# Patient Record
Sex: FEMALE | Race: WHITE | NOT HISPANIC OR LATINO | URBAN - METROPOLITAN AREA
[De-identification: names, ages, dates, MRNs, and addresses within clinical notes are randomized per-mention and may not be internally consistent; named-entity substitution may affect disease eponyms.]

---

## 2025-01-06 ENCOUNTER — APPOINTMENT (OUTPATIENT)
Dept: OBSTETRICS AND GYNECOLOGY | Facility: CLINIC | Age: 45
End: 2025-01-06
Payer: COMMERCIAL

## 2025-01-06 ENCOUNTER — PATIENT MESSAGE (OUTPATIENT)
Dept: OBSTETRICS AND GYNECOLOGY | Facility: CLINIC | Age: 45
End: 2025-01-06

## 2025-01-06 ENCOUNTER — LAB (OUTPATIENT)
Dept: LAB | Facility: LAB | Age: 45
End: 2025-01-06
Payer: COMMERCIAL

## 2025-01-06 VITALS
BODY MASS INDEX: 36.96 KG/M2 | DIASTOLIC BLOOD PRESSURE: 78 MMHG | WEIGHT: 230 LBS | SYSTOLIC BLOOD PRESSURE: 146 MMHG | HEIGHT: 66 IN

## 2025-01-06 DIAGNOSIS — N93.8 DUB (DYSFUNCTIONAL UTERINE BLEEDING): ICD-10-CM

## 2025-01-06 DIAGNOSIS — N93.8 DUB (DYSFUNCTIONAL UTERINE BLEEDING): Primary | ICD-10-CM

## 2025-01-06 LAB
B-HCG SERPL-ACNC: 171 MIU/ML
PROLACTIN SERPL-MCNC: 8.4 UG/L (ref 3–20)
TSH SERPL-ACNC: 1.46 MIU/L (ref 0.44–3.98)

## 2025-01-06 PROCEDURE — 1036F TOBACCO NON-USER: CPT | Performed by: NURSE PRACTITIONER

## 2025-01-06 PROCEDURE — 84146 ASSAY OF PROLACTIN: CPT

## 2025-01-06 PROCEDURE — 99203 OFFICE O/P NEW LOW 30 MIN: CPT | Performed by: NURSE PRACTITIONER

## 2025-01-06 PROCEDURE — 84443 ASSAY THYROID STIM HORMONE: CPT

## 2025-01-06 PROCEDURE — 84702 CHORIONIC GONADOTROPIN TEST: CPT

## 2025-01-06 PROCEDURE — 3008F BODY MASS INDEX DOCD: CPT | Performed by: NURSE PRACTITIONER

## 2025-01-06 RX ORDER — LEVONORGESTREL 52 MG/1
1 INTRAUTERINE DEVICE INTRAUTERINE ONCE
COMMUNITY

## 2025-01-06 RX ORDER — IBUPROFEN 600 MG/1
TABLET ORAL
COMMUNITY
Start: 2019-09-25

## 2025-01-06 RX ORDER — NORETHINDRONE 5 MG/1
5 TABLET ORAL SEE ADMIN INSTRUCTIONS
Qty: 60 TABLET | Refills: 0 | Status: SHIPPED | OUTPATIENT
Start: 2025-01-06 | End: 2025-01-16

## 2025-01-06 RX ORDER — ERGOCALCIFEROL 1.25 MG/1
CAPSULE ORAL
COMMUNITY

## 2025-01-06 ASSESSMENT — ENCOUNTER SYMPTOMS
RESPIRATORY NEGATIVE: 1
PSYCHIATRIC NEGATIVE: 1
MUSCULOSKELETAL NEGATIVE: 1
EYES NEGATIVE: 1
CONSTITUTIONAL NEGATIVE: 1
GASTROINTESTINAL NEGATIVE: 1
NEUROLOGICAL NEGATIVE: 1
CARDIOVASCULAR NEGATIVE: 1
ENDOCRINE NEGATIVE: 1

## 2025-01-06 NOTE — PROGRESS NOTES
Subjective   Patient ID: Yuki Mclaughlin is a 44 y.o. female who presents for Annual Exam (Period started 12/12/2024 and has had continuous bleeding, insomnia, mood changes and cramping. States this is the first time she has had a period since IUD. /Mirena was inserted 7/29/2021. Last visit 8/26/2021/Normal PAP / HPV 11/4/2019.).  44 year old here for complaints of having irregular bleeding.  She notes on December 12 she started having a period that became heavy and she was soaking tampons every few hours.  She also was having a lot of cramping.  She then had the bleeding taper off for a few days and then it restarted heavy again.  She notes it did this three times in the last 2 weeks.  She has the Mirena IUD and notes that her normal periods are light spotting every so often and no pain.  She notices the pain is mostly on her left side and will be a sharp pain.  She previously had an ovarian cyst once.         Review of Systems   Constitutional: Negative.    HENT: Negative.     Eyes: Negative.    Respiratory: Negative.     Cardiovascular: Negative.    Gastrointestinal: Negative.    Endocrine: Negative.    Genitourinary:  Positive for menstrual problem and pelvic pain.   Musculoskeletal: Negative.    Skin: Negative.    Neurological: Negative.    Psychiatric/Behavioral: Negative.         Objective   Physical Exam  Vitals reviewed.   Constitutional:       Appearance: Normal appearance. She is well-developed.   Pulmonary:      Effort: Pulmonary effort is normal. No respiratory distress.   Chest:   Breasts:     Breasts are symmetrical.      Right: Normal. No swelling, bleeding, inverted nipple, mass, nipple discharge, skin change or tenderness.      Left: Normal. No swelling, bleeding, inverted nipple, mass, nipple discharge, skin change or tenderness.   Abdominal:      Palpations: Abdomen is soft.   Genitourinary:     General: Normal vulva.      Exam position: Lithotomy position.      Pubic Area: No rash.       Labia:          Right: No rash, tenderness, lesion or injury.         Left: No rash, tenderness, lesion or injury.       Urethra: No prolapse, urethral pain, urethral swelling or urethral lesion.      Vagina: Normal. No vaginal discharge.      Cervix: Cervical bleeding present.      Uterus: Normal.       Adnexa: Right adnexa normal.        Left: Tenderness present.       Rectum: Normal.   Musculoskeletal:         General: Normal range of motion.   Lymphadenopathy:      Upper Body:      Right upper body: No supraclavicular, axillary or pectoral adenopathy.      Left upper body: No supraclavicular, axillary or pectoral adenopathy.   Skin:     General: Skin is warm and dry.   Neurological:      General: No focal deficit present.      Mental Status: She is alert and oriented to person, place, and time. Mental status is at baseline.   Psychiatric:         Attention and Perception: Attention and perception normal.         Mood and Affect: Mood and affect normal.         Speech: Speech normal.         Behavior: Behavior normal. Behavior is cooperative.         Thought Content: Thought content normal.         Judgment: Judgment normal.         Assessment/Plan   Problem List Items Addressed This Visit             ICD-10-CM    DUB (dysfunctional uterine bleeding) - Primary N93.8     Aygestin ordered  Pelvic ultrasound ordered  HCG, TSH with reflex t4, prolactin ordered  Reviewed if normal results will continue to monitor, if bleeding continues will try to exchange out IUD.  Also offered surgical options such as ablation  if patient desires         Relevant Medications    norethindrone (Aygestin) 5 mg tablet    Other Relevant Orders    US PELVIS TRANSABDOMINAL WITH TRANSVAGINAL    Human Chorionic Gonadotropin, Serum Quantitative    TSH with reflex to Free T4 if abnormal    Prolactin     Follow up for annual exam, sooner as needed        JULIA Orr-CNP 01/06/25 10:17 AM

## 2025-01-06 NOTE — ASSESSMENT & PLAN NOTE
Aygestin ordered  Pelvic ultrasound ordered  HCG, TSH with reflex t4, prolactin ordered  Reviewed if normal results will continue to monitor, if bleeding continues will try to exchange out IUD.  Also offered surgical options such as ablation  if patient desires

## 2025-01-07 ENCOUNTER — TELEPHONE (OUTPATIENT)
Dept: OBSTETRICS AND GYNECOLOGY | Facility: CLINIC | Age: 45
End: 2025-01-07
Payer: COMMERCIAL

## 2025-01-07 DIAGNOSIS — Z32.01 PREGNANCY TEST POSITIVE (HHS-HCC): ICD-10-CM

## 2025-01-07 NOTE — TELEPHONE ENCOUNTER
Called and spoke with patient. She is unable to get her ultrasound done today or tomorrow due to her work scheduled. She states no significant pain just slight cramping. Patient agrees to repeat the blood work tomorrow and will proceed with her scheduled ultrasound as she can not come sooner. She was advised to call office / go to ER if pain changes.

## 2025-01-07 NOTE — TELEPHONE ENCOUNTER
----- Message from Molly Isaacs sent at 1/6/2025  6:17 PM EST -----  Please reach out to patient to see if she is having any pain (sharp pain, increased pain) as she had occasional pain and her lab work yesterday returned showing elevated hcg (with IUD).  She has ultrasound scheduled for 1/9, but this may need to be moved up or she may need to go to the ER.  I sent her a Xiaohongshu message tonight.  I did call her, but no answer.    Linda  ----- Message -----  From: Lab, Background User  Sent: 1/6/2025   4:55 PM EST  To: JULIA Orr-CNP

## 2025-01-08 ENCOUNTER — TELEPHONE (OUTPATIENT)
Dept: OBSTETRICS AND GYNECOLOGY | Facility: CLINIC | Age: 45
End: 2025-01-08

## 2025-01-08 ENCOUNTER — LAB (OUTPATIENT)
Dept: LAB | Facility: LAB | Age: 45
End: 2025-01-08
Payer: COMMERCIAL

## 2025-01-08 DIAGNOSIS — Z32.01 PREGNANCY TEST POSITIVE (HHS-HCC): ICD-10-CM

## 2025-01-08 DIAGNOSIS — Z32.01 PREGNANCY TEST POSITIVE (HHS-HCC): Primary | ICD-10-CM

## 2025-01-08 LAB — B-HCG SERPL-ACNC: 116 MIU/ML

## 2025-01-08 PROCEDURE — 84702 CHORIONIC GONADOTROPIN TEST: CPT

## 2025-01-08 NOTE — TELEPHONE ENCOUNTER
The following was discussed with patient, she agrees to continue with ultrasound and will return the lab next week to have repeat quant done. States no pain today.

## 2025-01-08 NOTE — TELEPHONE ENCOUNTER
----- Message from Molly Isaacs sent at 1/8/2025 12:13 PM EST -----  Please inform patient that her hcg level is decreasing indicating a miscarriage.  The ultrasound is going to provide us more feedback on the IUD to see if it is in the proper place or not and give further insight on how this has occurred.  We will continue to monitor her hcg levels once a week until they go back to negative/normal range.  I have placed the order for next week, but this plan may change based on the ultrasound findings.

## 2025-01-09 ENCOUNTER — HOSPITAL ENCOUNTER (OUTPATIENT)
Dept: RADIOLOGY | Facility: CLINIC | Age: 45
Discharge: HOME | End: 2025-01-09
Payer: COMMERCIAL

## 2025-01-09 DIAGNOSIS — N93.8 DUB (DYSFUNCTIONAL UTERINE BLEEDING): ICD-10-CM

## 2025-01-09 PROCEDURE — 76856 US EXAM PELVIC COMPLETE: CPT

## 2025-01-13 ENCOUNTER — TELEPHONE (OUTPATIENT)
Dept: OBSTETRICS AND GYNECOLOGY | Facility: CLINIC | Age: 45
End: 2025-01-13
Payer: COMMERCIAL

## 2025-01-13 NOTE — TELEPHONE ENCOUNTER
Attempted to contact patient, left message on machine to call office with any questions or concerns. Linda did send a detail message to patient.

## 2025-01-13 NOTE — TELEPHONE ENCOUNTER
----- Message from Molly Isaacs sent at 1/13/2025 12:32 PM EST -----  Please inform patient that her ultrasound returned showing the IUD in place, a fibroid in the uterus and a right ovarian cyst.  No further monitoring is needed at this time as long as her pain remains gone. If pain returns we can repeat an ultrasound in 1-2 cycles to monitor the ovarian cyst.  We will continue to monitor her BHCG levels to make sure they drop to negative.  For follow up I suggest returning to the office with a physician for a tubal consult.  She can keep the IUD and do a tubal to increase pregnancy prevention.  Please have her schedule if she desires this option.

## 2025-01-13 NOTE — TELEPHONE ENCOUNTER
"Patient was informed of the following, she states she is going to think about scheduling with a physician for a tubal ligation, \" I have a lot going on right now, I will call the office in a few days\"  "

## 2025-01-17 ENCOUNTER — LAB (OUTPATIENT)
Dept: LAB | Facility: LAB | Age: 45
End: 2025-01-17
Payer: COMMERCIAL

## 2025-01-17 DIAGNOSIS — Z32.01 PREGNANCY TEST POSITIVE (HHS-HCC): ICD-10-CM

## 2025-01-17 LAB — B-HCG SERPL-ACNC: 24 MIU/ML

## 2025-01-17 PROCEDURE — 84702 CHORIONIC GONADOTROPIN TEST: CPT

## 2025-01-20 DIAGNOSIS — N93.8 DUB (DYSFUNCTIONAL UTERINE BLEEDING): Primary | ICD-10-CM

## 2025-01-28 ENCOUNTER — TELEPHONE (OUTPATIENT)
Dept: OBSTETRICS AND GYNECOLOGY | Facility: CLINIC | Age: 45
End: 2025-01-28
Payer: COMMERCIAL

## 2025-01-28 LAB — B-HCG SERPL-ACNC: 5 MIU/ML

## 2025-01-28 NOTE — TELEPHONE ENCOUNTER
Attempted to contact patient, left detail message on machine. Informed to call office with any questions or concerns.

## 2025-01-28 NOTE — TELEPHONE ENCOUNTER
----- Message from Molly Isaacs sent at 1/28/2025  1:02 PM EST -----  Please inform patient that her blood work has reduced to normal and negative.  Continue with the tubal consult she has scheduled and use condoms for now until her consultation

## 2025-02-10 ENCOUNTER — TELEPHONE (OUTPATIENT)
Dept: OBSTETRICS AND GYNECOLOGY | Facility: CLINIC | Age: 45
End: 2025-02-10
Payer: COMMERCIAL

## 2025-02-13 ENCOUNTER — APPOINTMENT (OUTPATIENT)
Dept: OBSTETRICS AND GYNECOLOGY | Facility: CLINIC | Age: 45
End: 2025-02-13
Payer: COMMERCIAL

## 2025-02-13 LAB — B-HCG SERPL-ACNC: <5 MIU/ML

## 2025-02-17 ENCOUNTER — HOSPITAL ENCOUNTER (OUTPATIENT)
Dept: RADIOLOGY | Facility: CLINIC | Age: 45
Discharge: HOME | End: 2025-02-17
Payer: COMMERCIAL

## 2025-02-17 DIAGNOSIS — N93.8 DUB (DYSFUNCTIONAL UTERINE BLEEDING): ICD-10-CM

## 2025-02-17 PROCEDURE — 76856 US EXAM PELVIC COMPLETE: CPT

## 2025-02-17 PROCEDURE — 76830 TRANSVAGINAL US NON-OB: CPT | Performed by: RADIOLOGY

## 2025-02-17 PROCEDURE — 76856 US EXAM PELVIC COMPLETE: CPT | Performed by: RADIOLOGY

## 2025-02-18 ENCOUNTER — TELEPHONE (OUTPATIENT)
Dept: OBSTETRICS AND GYNECOLOGY | Facility: CLINIC | Age: 45
End: 2025-02-18
Payer: COMMERCIAL

## 2025-02-18 DIAGNOSIS — N94.89 ADNEXAL MASS: Primary | ICD-10-CM

## 2025-02-18 NOTE — TELEPHONE ENCOUNTER
----- Message from Molly Isaacs sent at 2/17/2025  5:12 PM EST -----  Please inform patient her level returned less than 5 and she can stop the hcg testing.

## 2025-02-18 NOTE — TELEPHONE ENCOUNTER
----- Message from Molly Isaacs sent at 2/18/2025 11:43 AM EST -----  Please inform patient that her ultrasound returned showing the area to be the same size as previously.  The area did not decrease as we would expect an ovarian cyst to do.  The next step is the option to do an MRI for further evaluation of the area and if needed to be removed could be reviewed with the surgical consultation in March with Dr. Flores.

## 2025-03-03 ENCOUNTER — PREP FOR PROCEDURE (OUTPATIENT)
Dept: OBSTETRICS AND GYNECOLOGY | Facility: CLINIC | Age: 45
End: 2025-03-03

## 2025-03-03 ENCOUNTER — APPOINTMENT (OUTPATIENT)
Dept: OBSTETRICS AND GYNECOLOGY | Facility: CLINIC | Age: 45
End: 2025-03-03
Payer: COMMERCIAL

## 2025-03-03 VITALS
BODY MASS INDEX: 36.22 KG/M2 | DIASTOLIC BLOOD PRESSURE: 92 MMHG | SYSTOLIC BLOOD PRESSURE: 138 MMHG | HEIGHT: 67 IN | WEIGHT: 230.8 LBS

## 2025-03-03 DIAGNOSIS — N93.8 DUB (DYSFUNCTIONAL UTERINE BLEEDING): Primary | ICD-10-CM

## 2025-03-03 DIAGNOSIS — O03.9: ICD-10-CM

## 2025-03-03 DIAGNOSIS — Z30.09 ENCOUNTER FOR STERILIZATION EDUCATION: ICD-10-CM

## 2025-03-03 DIAGNOSIS — R19.00 PELVIC MASS: ICD-10-CM

## 2025-03-03 DIAGNOSIS — Z87.42 HISTORY OF MENORRHAGIA: ICD-10-CM

## 2025-03-03 DIAGNOSIS — N92.6 IRREGULAR BLEEDING: ICD-10-CM

## 2025-03-03 DIAGNOSIS — R93.89 ABNORMAL PELVIC ULTRASOUND: Primary | ICD-10-CM

## 2025-03-03 DIAGNOSIS — O00.102: ICD-10-CM

## 2025-03-03 PROCEDURE — 3008F BODY MASS INDEX DOCD: CPT | Performed by: OBSTETRICS & GYNECOLOGY

## 2025-03-03 PROCEDURE — 99215 OFFICE O/P EST HI 40 MIN: CPT | Performed by: OBSTETRICS & GYNECOLOGY

## 2025-03-03 PROCEDURE — 1036F TOBACCO NON-USER: CPT | Performed by: OBSTETRICS & GYNECOLOGY

## 2025-03-03 RX ORDER — ACETAMINOPHEN 325 MG/1
975 TABLET ORAL ONCE
OUTPATIENT
Start: 2025-03-03 | End: 2025-03-03

## 2025-03-03 RX ORDER — CELECOXIB 400 MG/1
400 CAPSULE ORAL ONCE
OUTPATIENT
Start: 2025-03-03 | End: 2025-03-03

## 2025-03-03 RX ORDER — GABAPENTIN 600 MG/1
600 TABLET ORAL ONCE
OUTPATIENT
Start: 2025-03-03 | End: 2025-03-03

## 2025-03-03 ASSESSMENT — PATIENT HEALTH QUESTIONNAIRE - PHQ9
2. FEELING DOWN, DEPRESSED OR HOPELESS: NOT AT ALL
SUM OF ALL RESPONSES TO PHQ9 QUESTIONS 1 & 2: 0
1. LITTLE INTEREST OR PLEASURE IN DOING THINGS: NOT AT ALL

## 2025-03-03 NOTE — PROGRESS NOTES
Subjective   Patient ID: Yuki Mclaughlin is a 44 y.o. female who presents for No chief complaint on file..  HPI 44 years old G3, P2 presents to my office with chief complaint of bleeding since November.  She says that she saw our nurse practitioner and at 1 point she was told she was pregnant.  She says she has a Mirena IUD for the last 4 years.  She says subsequent blood work showed that pregnancy levels dropped.  She says her bleeding has been on and off but persistent most of the time and light and spotting.  She denies any pelvic or abdominal pain.  She says that in the past she has had heavy cycles prior to getting Mirena IUD, but they were always regular.      Review of Systems   Genitourinary:  Positive for vaginal bleeding.   All other systems reviewed and are negative.      Objective   Physical Exam  Constitutional:       Appearance: Normal appearance. She is obese.   Cardiovascular:      Rate and Rhythm: Normal rate and regular rhythm.   Pulmonary:      Effort: Pulmonary effort is normal.      Breath sounds: Normal breath sounds.   Abdominal:      General: Abdomen is flat.      Palpations: Abdomen is soft.      Tenderness: There is no abdominal tenderness. There is no rebound.   Neurological:      Mental Status: She is alert.     Pelvic deferred    Assessment/Plan   Problem List Items Addressed This Visit    None  Visit Diagnoses         Codes    Abnormal pelvic ultrasound    -  Primary R93.89    Pelvic mass     R19.00    Miscarriage of left tubal ectopic pregnancy ([facility])     O03.9, O00.102    Irregular bleeding     N92.6    History of menorrhagia     Z87.42        Today I reviewed her symptoms starting in November as well as her serial quantitative beta-hCG and two pelvic ultrasound that have been done.  I discussed with her that most likely she had a left ectopic pregnancy and that she spontaneously miscarried.  I have recommended that she consider having diagnostic laparoscopy with removal of  possible left adnexal mass, bilateral salpingectomy and removal of Mirena IUD.  We also discussed that we can offer her an endometrial ablation because of her history of menorrhagia in the past.  I have discussed in detail of procedure provided her with brochure about Jory ablation.  We discussed risk of surgery including injury to nearby organs such as bladder ureter and bowel, risk of bleeding requiring blood transfusion, risk of infection and possible need to open.  Patient agrees with plan and wants to proceed with hysteroscopy D&C Jory ablation and removal of her IUD and proceed with laparoscopic bilateral salpingectomy.  She says she does not want to get pregnant and since her IUD seems to have failed most likely she had an ectopic pregnancy that seem to have spontaneously resolved, she is a good candidate for ablation and bilateral salpingectomy.  She signed a consent form today and I have entered an order for this to be placed and done at [facility].  She also signed a consent for Medicaid today.  40 mins with > 50 % of the time reviewing records, and maing recommendation to procceed with above surgeries as a definitive diagnostic and therapeutic options.            Andrei Flores MD 03/03/25 12:39 PM

## 2025-03-03 NOTE — PROGRESS NOTES
Subjective   Patient ID: Yuki Mclaughlin is a 44 y.o. female who presents for No chief complaint on file..  HPI    Review of Systems    Objective   Physical Exam    Assessment/Plan            Andrei Flores MD 03/03/25 10:22 AM

## 2025-03-05 PROBLEM — R19.00 PELVIC MASS: Status: ACTIVE | Noted: 2025-03-03

## 2025-03-05 PROBLEM — Z30.09 ENCOUNTER FOR STERILIZATION EDUCATION: Status: ACTIVE | Noted: 2025-03-03

## 2025-03-10 ENCOUNTER — APPOINTMENT (OUTPATIENT)
Dept: RADIOLOGY | Facility: HOSPITAL | Age: 45
End: 2025-03-10
Payer: COMMERCIAL

## 2025-03-27 ENCOUNTER — ANESTHESIA EVENT (OUTPATIENT)
Dept: OPERATING ROOM | Facility: HOSPITAL | Age: 45
End: 2025-03-27
Payer: COMMERCIAL

## 2025-04-02 ENCOUNTER — ANESTHESIA (OUTPATIENT)
Dept: OPERATING ROOM | Facility: HOSPITAL | Age: 45
End: 2025-04-02
Payer: COMMERCIAL

## 2025-04-02 ENCOUNTER — PHARMACY VISIT (OUTPATIENT)
Dept: PHARMACY | Facility: CLINIC | Age: 45
End: 2025-04-02
Payer: MEDICARE

## 2025-04-02 ENCOUNTER — HOSPITAL ENCOUNTER (OUTPATIENT)
Facility: HOSPITAL | Age: 45
Setting detail: OUTPATIENT SURGERY
Discharge: HOME | End: 2025-04-02
Attending: OBSTETRICS & GYNECOLOGY | Admitting: OBSTETRICS & GYNECOLOGY
Payer: COMMERCIAL

## 2025-04-02 VITALS
HEART RATE: 59 BPM | OXYGEN SATURATION: 98 % | BODY MASS INDEX: 36.65 KG/M2 | TEMPERATURE: 97.8 F | WEIGHT: 220 LBS | SYSTOLIC BLOOD PRESSURE: 107 MMHG | RESPIRATION RATE: 14 BRPM | DIASTOLIC BLOOD PRESSURE: 74 MMHG | HEIGHT: 65 IN

## 2025-04-02 DIAGNOSIS — G89.18 POSTOPERATIVE PAIN: ICD-10-CM

## 2025-04-02 DIAGNOSIS — Z30.09 ENCOUNTER FOR STERILIZATION EDUCATION: ICD-10-CM

## 2025-04-02 DIAGNOSIS — R19.00 PELVIC MASS: ICD-10-CM

## 2025-04-02 DIAGNOSIS — N93.8 DUB (DYSFUNCTIONAL UTERINE BLEEDING): Primary | ICD-10-CM

## 2025-04-02 LAB — PREGNANCY TEST URINE, POC: NEGATIVE

## 2025-04-02 PROCEDURE — 96372 THER/PROPH/DIAG INJ SC/IM: CPT | Performed by: NURSE ANESTHETIST, CERTIFIED REGISTERED

## 2025-04-02 PROCEDURE — 7100000010 HC PHASE TWO TIME - EACH INCREMENTAL 1 MINUTE: Performed by: OBSTETRICS & GYNECOLOGY

## 2025-04-02 PROCEDURE — 88305 TISSUE EXAM BY PATHOLOGIST: CPT | Performed by: PATHOLOGY

## 2025-04-02 PROCEDURE — 7100000009 HC PHASE TWO TIME - INITIAL BASE CHARGE: Performed by: OBSTETRICS & GYNECOLOGY

## 2025-04-02 PROCEDURE — 2720000007 HC OR 272 NO HCPCS: Performed by: OBSTETRICS & GYNECOLOGY

## 2025-04-02 PROCEDURE — 88305 TISSUE EXAM BY PATHOLOGIST: CPT | Mod: TC,PORLAB | Performed by: OBSTETRICS & GYNECOLOGY

## 2025-04-02 PROCEDURE — 88302 TISSUE EXAM BY PATHOLOGIST: CPT | Performed by: PATHOLOGY

## 2025-04-02 PROCEDURE — 3700000001 HC GENERAL ANESTHESIA TIME - INITIAL BASE CHARGE: Performed by: OBSTETRICS & GYNECOLOGY

## 2025-04-02 PROCEDURE — 58661 LAPAROSCOPY REMOVE ADNEXA: CPT | Performed by: OBSTETRICS & GYNECOLOGY

## 2025-04-02 PROCEDURE — 7100000002 HC RECOVERY ROOM TIME - EACH INCREMENTAL 1 MINUTE: Performed by: OBSTETRICS & GYNECOLOGY

## 2025-04-02 PROCEDURE — 2500000001 HC RX 250 WO HCPCS SELF ADMINISTERED DRUGS (ALT 637 FOR MEDICARE OP): Performed by: OBSTETRICS & GYNECOLOGY

## 2025-04-02 PROCEDURE — 3600000008 HC OR TIME - EACH INCREMENTAL 1 MINUTE - PROCEDURE LEVEL THREE: Performed by: OBSTETRICS & GYNECOLOGY

## 2025-04-02 PROCEDURE — 3600000003 HC OR TIME - INITIAL BASE CHARGE - PROCEDURE LEVEL THREE: Performed by: OBSTETRICS & GYNECOLOGY

## 2025-04-02 PROCEDURE — 2500000005 HC RX 250 GENERAL PHARMACY W/O HCPCS: Performed by: NURSE ANESTHETIST, CERTIFIED REGISTERED

## 2025-04-02 PROCEDURE — 58563 HYSTEROSCOPY ABLATION: CPT | Performed by: OBSTETRICS & GYNECOLOGY

## 2025-04-02 PROCEDURE — 2500000004 HC RX 250 GENERAL PHARMACY W/ HCPCS (ALT 636 FOR OP/ED): Mod: JZ | Performed by: ANESTHESIOLOGY

## 2025-04-02 PROCEDURE — 3700000002 HC GENERAL ANESTHESIA TIME - EACH INCREMENTAL 1 MINUTE: Performed by: OBSTETRICS & GYNECOLOGY

## 2025-04-02 PROCEDURE — 2500000005 HC RX 250 GENERAL PHARMACY W/O HCPCS: Performed by: OBSTETRICS & GYNECOLOGY

## 2025-04-02 PROCEDURE — 2500000004 HC RX 250 GENERAL PHARMACY W/ HCPCS (ALT 636 FOR OP/ED): Performed by: NURSE ANESTHETIST, CERTIFIED REGISTERED

## 2025-04-02 PROCEDURE — RXMED WILLOW AMBULATORY MEDICATION CHARGE

## 2025-04-02 PROCEDURE — 81025 URINE PREGNANCY TEST: CPT | Performed by: ANESTHESIOLOGY

## 2025-04-02 PROCEDURE — 88112 CYTOPATH CELL ENHANCE TECH: CPT | Mod: TC | Performed by: OBSTETRICS & GYNECOLOGY

## 2025-04-02 PROCEDURE — 58301 REMOVE INTRAUTERINE DEVICE: CPT | Performed by: OBSTETRICS & GYNECOLOGY

## 2025-04-02 PROCEDURE — 7100000001 HC RECOVERY ROOM TIME - INITIAL BASE CHARGE: Performed by: OBSTETRICS & GYNECOLOGY

## 2025-04-02 PROCEDURE — 88112 CYTOPATH CELL ENHANCE TECH: CPT | Performed by: PATHOLOGY

## 2025-04-02 RX ORDER — DIPHENHYDRAMINE HYDROCHLORIDE 50 MG/ML
12.5 INJECTION, SOLUTION INTRAMUSCULAR; INTRAVENOUS ONCE AS NEEDED
Status: DISCONTINUED | OUTPATIENT
Start: 2025-04-02 | End: 2025-04-02 | Stop reason: HOSPADM

## 2025-04-02 RX ORDER — ACETAMINOPHEN 325 MG/1
975 TABLET ORAL ONCE
Status: COMPLETED | OUTPATIENT
Start: 2025-04-02 | End: 2025-04-02

## 2025-04-02 RX ORDER — MEPERIDINE HYDROCHLORIDE 25 MG/ML
12.5 INJECTION INTRAMUSCULAR; INTRAVENOUS; SUBCUTANEOUS EVERY 10 MIN PRN
Status: DISCONTINUED | OUTPATIENT
Start: 2025-04-02 | End: 2025-04-02 | Stop reason: HOSPADM

## 2025-04-02 RX ORDER — HYDRALAZINE HYDROCHLORIDE 20 MG/ML
5 INJECTION INTRAMUSCULAR; INTRAVENOUS EVERY 30 MIN PRN
Status: DISCONTINUED | OUTPATIENT
Start: 2025-04-02 | End: 2025-04-02 | Stop reason: HOSPADM

## 2025-04-02 RX ORDER — ROCURONIUM BROMIDE 50 MG/5 ML
SYRINGE (ML) INTRAVENOUS AS NEEDED
Status: DISCONTINUED | OUTPATIENT
Start: 2025-04-02 | End: 2025-04-02

## 2025-04-02 RX ORDER — LIDOCAINE HCL/PF 100 MG/5ML
SYRINGE (ML) INTRAVENOUS AS NEEDED
Status: DISCONTINUED | OUTPATIENT
Start: 2025-04-02 | End: 2025-04-02

## 2025-04-02 RX ORDER — KETOROLAC TROMETHAMINE 30 MG/ML
INJECTION, SOLUTION INTRAMUSCULAR; INTRAVENOUS AS NEEDED
Status: DISCONTINUED | OUTPATIENT
Start: 2025-04-02 | End: 2025-04-02

## 2025-04-02 RX ORDER — ONDANSETRON HYDROCHLORIDE 2 MG/ML
4 INJECTION, SOLUTION INTRAVENOUS ONCE AS NEEDED
Status: DISCONTINUED | OUTPATIENT
Start: 2025-04-02 | End: 2025-04-02 | Stop reason: HOSPADM

## 2025-04-02 RX ORDER — GABAPENTIN 300 MG/1
600 CAPSULE ORAL ONCE
Status: COMPLETED | OUTPATIENT
Start: 2025-04-02 | End: 2025-04-02

## 2025-04-02 RX ORDER — MIDAZOLAM HYDROCHLORIDE 1 MG/ML
INJECTION, SOLUTION INTRAMUSCULAR; INTRAVENOUS AS NEEDED
Status: DISCONTINUED | OUTPATIENT
Start: 2025-04-02 | End: 2025-04-02

## 2025-04-02 RX ORDER — OXYCODONE AND ACETAMINOPHEN 5; 325 MG/1; MG/1
1 TABLET ORAL EVERY 4 HOURS PRN
Status: DISCONTINUED | OUTPATIENT
Start: 2025-04-02 | End: 2025-04-02 | Stop reason: HOSPADM

## 2025-04-02 RX ORDER — MORPHINE SULFATE 2 MG/ML
2 INJECTION, SOLUTION INTRAMUSCULAR; INTRAVENOUS EVERY 5 MIN PRN
Status: DISCONTINUED | OUTPATIENT
Start: 2025-04-02 | End: 2025-04-02 | Stop reason: HOSPADM

## 2025-04-02 RX ORDER — FAMOTIDINE 10 MG/ML
20 INJECTION, SOLUTION INTRAVENOUS ONCE
Status: COMPLETED | OUTPATIENT
Start: 2025-04-02 | End: 2025-04-02

## 2025-04-02 RX ORDER — IBUPROFEN 400 MG/1
800 TABLET ORAL EVERY 8 HOURS PRN
Qty: 21 TABLET | Refills: 1 | Status: SHIPPED | OUTPATIENT
Start: 2025-04-02

## 2025-04-02 RX ORDER — LABETALOL HYDROCHLORIDE 5 MG/ML
5 INJECTION, SOLUTION INTRAVENOUS ONCE AS NEEDED
Status: DISCONTINUED | OUTPATIENT
Start: 2025-04-02 | End: 2025-04-02 | Stop reason: HOSPADM

## 2025-04-02 RX ORDER — CELECOXIB 200 MG/1
400 CAPSULE ORAL ONCE
Status: COMPLETED | OUTPATIENT
Start: 2025-04-02 | End: 2025-04-02

## 2025-04-02 RX ORDER — LIDOCAINE HYDROCHLORIDE 10 MG/ML
0.1 INJECTION, SOLUTION EPIDURAL; INFILTRATION; INTRACAUDAL; PERINEURAL ONCE
Status: DISCONTINUED | OUTPATIENT
Start: 2025-04-02 | End: 2025-04-02 | Stop reason: HOSPADM

## 2025-04-02 RX ORDER — SODIUM CHLORIDE, SODIUM LACTATE, POTASSIUM CHLORIDE, CALCIUM CHLORIDE 600; 310; 30; 20 MG/100ML; MG/100ML; MG/100ML; MG/100ML
75 INJECTION, SOLUTION INTRAVENOUS CONTINUOUS
Status: DISCONTINUED | OUTPATIENT
Start: 2025-04-02 | End: 2025-04-02 | Stop reason: HOSPADM

## 2025-04-02 RX ORDER — ALBUTEROL SULFATE 0.83 MG/ML
2.5 SOLUTION RESPIRATORY (INHALATION) ONCE AS NEEDED
Status: DISCONTINUED | OUTPATIENT
Start: 2025-04-02 | End: 2025-04-02 | Stop reason: HOSPADM

## 2025-04-02 RX ORDER — PROPOFOL 10 MG/ML
INJECTION, EMULSION INTRAVENOUS AS NEEDED
Status: DISCONTINUED | OUTPATIENT
Start: 2025-04-02 | End: 2025-04-02

## 2025-04-02 RX ORDER — ONDANSETRON HYDROCHLORIDE 2 MG/ML
INJECTION, SOLUTION INTRAVENOUS AS NEEDED
Status: DISCONTINUED | OUTPATIENT
Start: 2025-04-02 | End: 2025-04-02

## 2025-04-02 RX ORDER — FENTANYL CITRATE 50 UG/ML
INJECTION, SOLUTION INTRAMUSCULAR; INTRAVENOUS AS NEEDED
Status: DISCONTINUED | OUTPATIENT
Start: 2025-04-02 | End: 2025-04-02

## 2025-04-02 RX ORDER — SODIUM CHLORIDE 0.9 G/100ML
INJECTION, SOLUTION IRRIGATION AS NEEDED
Status: DISCONTINUED | OUTPATIENT
Start: 2025-04-02 | End: 2025-04-02 | Stop reason: HOSPADM

## 2025-04-02 RX ORDER — SODIUM CHLORIDE, SODIUM LACTATE, POTASSIUM CHLORIDE, CALCIUM CHLORIDE 600; 310; 30; 20 MG/100ML; MG/100ML; MG/100ML; MG/100ML
100 INJECTION, SOLUTION INTRAVENOUS CONTINUOUS
Status: DISCONTINUED | OUTPATIENT
Start: 2025-04-02 | End: 2025-04-02 | Stop reason: HOSPADM

## 2025-04-02 RX ORDER — DROPERIDOL 2.5 MG/ML
0.62 INJECTION, SOLUTION INTRAMUSCULAR; INTRAVENOUS ONCE AS NEEDED
Status: DISCONTINUED | OUTPATIENT
Start: 2025-04-02 | End: 2025-04-02 | Stop reason: HOSPADM

## 2025-04-02 RX ORDER — OXYCODONE HYDROCHLORIDE 5 MG/1
5 TABLET ORAL EVERY 6 HOURS PRN
Qty: 15 TABLET | Refills: 0 | Status: SHIPPED | OUTPATIENT
Start: 2025-04-02

## 2025-04-02 RX ADMIN — KETOROLAC TROMETHAMINE 30 MG: 30 INJECTION, SOLUTION INTRAMUSCULAR at 13:02

## 2025-04-02 RX ADMIN — ACETAMINOPHEN 975 MG: 325 TABLET ORAL at 09:07

## 2025-04-02 RX ADMIN — HYDROMORPHONE HYDROCHLORIDE 0.5 MG: 0.5 INJECTION, SOLUTION INTRAMUSCULAR; INTRAVENOUS; SUBCUTANEOUS at 13:59

## 2025-04-02 RX ADMIN — LIDOCAINE HYDROCHLORIDE 100 MG: 20 INJECTION INTRAVENOUS at 11:50

## 2025-04-02 RX ADMIN — Medication 10 MG: at 12:55

## 2025-04-02 RX ADMIN — Medication 50 MG: at 11:50

## 2025-04-02 RX ADMIN — PROPOFOL 200 MG: 10 INJECTION, EMULSION INTRAVENOUS at 11:50

## 2025-04-02 RX ADMIN — MIDAZOLAM 2 MG: 1 INJECTION INTRAMUSCULAR; INTRAVENOUS at 11:38

## 2025-04-02 RX ADMIN — HYDROMORPHONE HYDROCHLORIDE 0.5 MG: 0.5 INJECTION, SOLUTION INTRAMUSCULAR; INTRAVENOUS; SUBCUTANEOUS at 14:13

## 2025-04-02 RX ADMIN — SODIUM CHLORIDE: 9 INJECTION, SOLUTION INTRAVENOUS at 13:14

## 2025-04-02 RX ADMIN — SODIUM CHLORIDE: 9 INJECTION, SOLUTION INTRAVENOUS at 11:38

## 2025-04-02 RX ADMIN — ONDANSETRON 4 MG: 2 INJECTION INTRAMUSCULAR; INTRAVENOUS at 13:04

## 2025-04-02 RX ADMIN — FENTANYL CITRATE 50 MCG: 50 INJECTION INTRAMUSCULAR; INTRAVENOUS at 12:25

## 2025-04-02 RX ADMIN — CELECOXIB 400 MG: 200 CAPSULE ORAL at 09:07

## 2025-04-02 RX ADMIN — GABAPENTIN 600 MG: 300 CAPSULE ORAL at 09:07

## 2025-04-02 RX ADMIN — FAMOTIDINE 20 MG: 10 INJECTION, SOLUTION INTRAVENOUS at 09:07

## 2025-04-02 RX ADMIN — Medication 20 MG: at 12:26

## 2025-04-02 RX ADMIN — DEXAMETHASONE SODIUM PHOSPHATE 8 MG: 4 INJECTION, SOLUTION INTRAMUSCULAR; INTRAVENOUS at 12:02

## 2025-04-02 RX ADMIN — SUGAMMADEX 200 MG: 100 INJECTION, SOLUTION INTRAVENOUS at 13:18

## 2025-04-02 RX ADMIN — FENTANYL CITRATE 50 MCG: 50 INJECTION INTRAMUSCULAR; INTRAVENOUS at 11:50

## 2025-04-02 SDOH — HEALTH STABILITY: MENTAL HEALTH: CURRENT SMOKER: 0

## 2025-04-02 ASSESSMENT — PAIN - FUNCTIONAL ASSESSMENT
PAIN_FUNCTIONAL_ASSESSMENT: 0-10

## 2025-04-02 ASSESSMENT — PAIN SCALES - GENERAL
PAINLEVEL_OUTOF10: 0 - NO PAIN
PAINLEVEL_OUTOF10: 4
PAIN_LEVEL: 4
PAINLEVEL_OUTOF10: 0 - NO PAIN
PAINLEVEL_OUTOF10: 4
PAINLEVEL_OUTOF10: 7
PAINLEVEL_OUTOF10: 4
PAINLEVEL_OUTOF10: 7
PAINLEVEL_OUTOF10: 4
PAINLEVEL_OUTOF10: 0 - NO PAIN
PAINLEVEL_OUTOF10: 4

## 2025-04-02 ASSESSMENT — PAIN DESCRIPTION - LOCATION
LOCATION: ABDOMEN
LOCATION: ABDOMEN

## 2025-04-02 ASSESSMENT — COLUMBIA-SUICIDE SEVERITY RATING SCALE - C-SSRS
6. HAVE YOU EVER DONE ANYTHING, STARTED TO DO ANYTHING, OR PREPARED TO DO ANYTHING TO END YOUR LIFE?: NO
2. HAVE YOU ACTUALLY HAD ANY THOUGHTS OF KILLING YOURSELF?: NO
1. IN THE PAST MONTH, HAVE YOU WISHED YOU WERE DEAD OR WISHED YOU COULD GO TO SLEEP AND NOT WAKE UP?: NO

## 2025-04-02 NOTE — DISCHARGE INSTRUCTIONS
No sex for 2 weeks.  No heavy lifting no driving for couple of days.  Ice to incision every hour for 10 to 15 minutes x 24 hours while awake.  No driving for 24 to 48 hours or until pain-free.  Called with uncontrolled pain, fever, persistent vomiting or heavy vaginal bleeding.  Follow-up in office in 2 weeks.

## 2025-04-02 NOTE — H&P
H&P    Assessment/Plan    Left adnexal mass  Desire for Sterility  Menorrhagia  For laparoscopy, removal of left adnexal mass, Bilateral salpingectomy  Hysteroscopy, removal of old IUD, endometrial ablation with Jory    Problem List    Left adnexal mass on U/S  Menorrhagia  Desire for sterilization               Subjective    44 years old  with prior  who has a Mirena IUD for contraception and menorrhagia developed positive pregnancy test but with spontaneous decline suspicious for ectopic pregnancy with a spontaneous  through the tube.  On ultrasound she has a mass near her left tube that is persistent questionable clots or products of conception.  She has decided for laparoscopy removal of her tubes and removal of IUD with endometrial ablation due to history of heavy cycles.    Prenatal Provider Sandra    OB History    Para Term  AB Living   2 2 0 0 0 0   SAB IAB Ectopic Multiple Live Births   0 0 0 0 2      # Outcome Date GA Lbr Anil/2nd Weight Sex Type Anes PTL Lv   2 Para            1 Para                Past Surgical History:   Procedure Laterality Date    OTHER SURGICAL HISTORY  2019    Carpal tunnel surgery       Social History     Tobacco Use    Smoking status: Never    Smokeless tobacco: Never   Substance Use Topics    Alcohol use: Yes     Alcohol/week: 2.0 standard drinks of alcohol     Types: 2 Shots of liquor per week       Allergies   Allergen Reactions    Codeine Unknown       Medications Prior to Admission   Medication Sig Dispense Refill Last Dose/Taking    ergocalciferol (Vitamin D-2) 1.25 MG (84387 UT) capsule TAKE 1 CAPSULE BY MOUTH ONE TIME A WEEK. USE AS DIRECTED.   Past Week    ibuprofen 600 mg tablet Take by mouth.   Past Month    levonorgestrel (Mirena) 20 mcg/24hr IUD 52 mg by intrauterine route 1 time.   2025 Morning    norethindrone (Aygestin) 5 mg tablet Take 1 tablet (5 mg) by mouth see administration instructions for 10 days. Every 4  hours until bleeding stops, then every 6 hours for three days, every 8 hours for three days, every 12 hours for three days, once a day for three days 60 tablet 0      Objective     Last Vitals  Temp Pulse Resp BP MAP O2 Sat                   Blood Pressures    No data found in the last 1 encounters.          Physical Exam  General: NAD, mood appropriate  Cardiopulmonary: warm and well perfused, breathing comfortably on room air  Lungs CTA  Heart RRR  Extremities: Symmetric  Speculum Exam: deferred  Cervix:   /  /      Chaperone Present:   Chaperone Name/Title:   Examination Chaperoned:

## 2025-04-02 NOTE — ANESTHESIA POSTPROCEDURE EVALUATION
Patient: Yuki Mclaughlin    Procedure Summary       Date: 04/02/25 Room / Location: [facility] OR 02 / Virtual [facility] OR    Anesthesia Start: 1138 Anesthesia Stop: 1331    Procedures:       Hysteroscopy ,dilation and curettage, zonia ablation - Removal of old IUD and Laparoscopic bilateral salpingectomy with removal left adnexal mass , aspiration pelvic fluid (Bilateral: Pelvis)      . (Bilateral: Pelvis)      . Diagnosis:       DUB (dysfunctional uterine bleeding)      Pelvic mass      Encounter for sterilization education      (DUB (dysfunctional uterine bleeding) [N93.8])      (Pelvic mass [R19.00])      (Encounter for sterilization education [Z30.09])    Surgeons: Andrei Flores MD Responsible Provider: ADRIAN Reis    Anesthesia Type: general ASA Status: 2            Anesthesia Type: general    Vitals Value Taken Time   /67 04/02/25 1420   Temp 36.6 °C (97.9 °F) 04/02/25 1420   Pulse 56 04/02/25 1419   Resp 13 04/02/25 1419   SpO2 96 % 04/02/25 1419   Vitals shown include unfiled device data.    Anesthesia Post Evaluation    Patient location during evaluation: PACU  Patient participation: complete - patient participated  Level of consciousness: awake  Pain score: 4  Pain management: adequate  Airway patency: patent  Cardiovascular status: acceptable  Respiratory status: acceptable  Hydration status: acceptable  Postoperative Nausea and Vomiting: none    There were no known notable events for this encounter.

## 2025-04-02 NOTE — OP NOTE
Date: 2025  OR Location: [facility] OR    Name: Yuki Mclaughlin, : 1980, Age: 44 y.o., MRN: [id number], Sex: female    Diagnosis  Pre-op Diagnosis      * DUB (dysfunctional uterine bleeding) [N93.8]     * Pelvic mass [R19.00]     * Encounter for sterilization education [Z30.09] Post-op Diagnosis     * DUB (dysfunctional uterine bleeding) [N93.8]     * Pelvic mass [R19.00]     * Encounter for sterilization education [Z30.09]     Procedures  Hysteroscopy dilation and curettage, zonia ablation - Removal of old IUD and Laparoscopic bilateral salpingectomy  63562 - UT HYSTEROSCOPY ENDOMETRIAL ABLATION    .  29833 - [place] LAPAROSCOPY W/RMVL ADNEXAL STRUCTURES    .  59471 - [place] EXC TUMOR SOFT TISSUE PELVIS & HIP SUBFASC <5CM      Surgeons      * Andrei Flores - Primary    Resident/Fellow/Other Assistant:  Surgeons and Role:  * No surgeons found with a matching role *    Staff:   Circulator: Payton Boss Person: Jessenia  Surgical Assistant: Wilfrid  Circulator: Sharifa Winn Circulator: Marlene    Anesthesia Staff: CRNA: JULIA Reis-CRNA    Procedure Summary  Anesthesia: General  ASA: II  Estimated Blood Loss: 5 mL  U.O 75 ml  Intra-op Medications:   Administrations occurring from 0945 to 1145 on 25:   Medication Name Total Dose   dexmedeTOMIDine (Precedex) bolus from bag 4 mcg   midazolam (Versed) injection 1 mg/mL 2 mg   NaCl 0.9 % bolus Cannot be calculated              Anesthesia Record               Intraprocedure I/O Totals          Intake    Dexmedetomidine 0.00 mL    The total shown is the total volume documented since Anesthesia Start was filed.    Total Intake 0 mL          Specimen:   ID Type Source Tests Collected by Time   1 : ENDOMETRIAL CURETTINGS Tissue ENDOMETRIUM CURETTINGS SURGICAL PATHOLOGY EXAM Andrei Flores MD 2025 1212   2 : FALLOPIAN TUBE + TUBAL MASS -  LEFT Tissue FALLOPIAN TUBE SALPINGECTOMY LEFT SURGICAL PATHOLOGY EXAM Andrei Flores MD 2025 1250   3 : FALLOPIAN TUBE  - RIGHT Tissue FALLOPIAN TUBE SALPINGECTOMY RIGHT SURGICAL PATHOLOGY EXAM Andrei Flores MD 4/2/2025 8240                 Procedure Details:  The patient was seen in the preoperative area. The site of surgery was properly noted/marked if necessary per policy. The patient has been actively warmed in preoperative area. Preoperative antibiotics are not indicated. Venous thrombosis prophylaxis have been ordered including bilateral sequential compression devices    Patient was brought to the operating room, huddle was done, she was put under general anesthesia in dorsolithotomy position prepped and draped usual fashion.  Exam under anesthesia showed showed uterus is slightly retroverted upper normal size.  Bladder was emptied with straight catheterization.    A weighted speculum was inserted into the vagina cervix was visualized and the anterior lip was grasped with single-tooth tenaculum endocervical canal was gently dilated.  I did not see the string at the beginning hysteroscope was introduced and the string was visualized in the canal and grasped and pulled out.  This was discarded.  The endocervical canal was gently dilated and hysteroscope was introduced no intracavitary lesion noticed.  Hysteroscope was removed sharp curettage was done from endometrial lining and sent to pathology.    Jory was then primed inserted to 6 and half centimeter maximum length allowed and a 2-minute ablation was carried out.  Jory was then retracted and removed pictures were retaken.  Uterine manipulator was inserted rest of the instrument were removed from the vagina.    Attention was then turned to the abdomen where a 5 mm incision was made infraumbilically with a scalpel using Veress needle pneumoperitoneum was obtained position was confirmed with the laparoscope through 5 mm trocar.  2 other incision were made 1 of 10 to 12 mm on the patient's left lower quadrant and the other 1 of 5 mm on the patient's right lower quadrant  and trocars were introduced into the abdomen under direct vision.  Survey of the pelvis showed above finding pictures were taken.  Slight filmy adhesion from the left adnexa to the anterior abdominal wall was cauterized and released fallopian tube on the left side was twisted with 2 to 3 cm cystic mass at the end of the.  Ureters on each side were identified and were out of harm.  Putting the fallopian tube on the left side on tension mesosalpinx needed was cauterized and cut this was carried all the way to the junction to the uterus and tube with the adnexal mass was removed from abdomen sent to pathology.  There were moderate amount of bloody fluid in the pelvis which was aspirated and sent for cytology.    Right tube was then put on tension and mesosalpinx needed was cauterized and cut all the way carried to the junction to the uterus and tube was removed from the abdomen sent to pathology.  Gas was lower copious irrigation was done no excess bleeding.  Pictures were retaken.  Gas was allowed to release all the instrument were removed from the abdomen counts were correct x 2.  Skin incision were closed off with 4-0 Vicryl.  Uterine manipulator was removed.  Patient was sent to recovery room awake in stable condition.  Findings: IUD string origin not visualized but I was able to visualize it with hysteroscope in the endocervical canal.  IUD removed intact.  Endometrial cavity symptoms large sounded to 9 cm but empty no intracavitary lesion.  Laparoscopic findings showed left tube twisted with some filmy adhesion to anterior abdominal wall and 2 to 3 cm cystic mass at the end of the tube with some blood clots in it noticed.  This is suspicious for an old ruptured ectopic.  Both ovaries normal with a small follicular cyst on the left ovary.  Right tube normal uterus also appeared slightly bulky otherwise normal no fibroid.    Complications:  None; patient tolerated the procedure well.     Disposition: PACU -  hemodynamically stable.  Condition: stable

## 2025-04-02 NOTE — ANESTHESIA PROCEDURE NOTES
Airway  Date/Time: 4/2/2025 11:55 AM  Urgency: elective    Airway not difficult    Staffing  Performed: CRNA   Authorized by: ADRIAN Reis    Performed by: ADRIAN Reis  Patient location during procedure: OR    Indications and Patient Condition  Indications for airway management: anesthesia  Spontaneous Ventilation: absent  Sedation level: deep  Preoxygenated: yes  Patient position: sniffing  Mask difficulty assessment: 1 - vent by mask  Planned trial extubation    Final Airway Details  Final airway type: endotracheal airway      Successful airway: ETT  Cuffed: yes   Successful intubation technique: video laryngoscopy (Lizarraga)  Facilitating devices/methods: intubating stylet  Endotracheal tube insertion site: oral  Blade: Mary  Blade size: #3  ETT size (mm): 7.5  Cormack-Lehane Classification: grade I - full view of glottis  Placement verified by: chest auscultation and capnometry   Cuff volume (mL): 9  Measured from: lips  ETT to lips (cm): 22  Number of attempts at approach: 1    Additional Comments  Atraumatic. Dentition and lips and in pre-induction condition.

## 2025-04-02 NOTE — ANESTHESIA PREPROCEDURE EVALUATION
Patient: Yuki Mclaughlin    Procedure Information       Date/Time: 04/02/25 0945    Procedures:       Hysteroscopy dilation and curettage, zonia ablation - Removal of old IUD and Laparoscopic bilateral salpingectomy (Bilateral: Pelvis) - Hysteroscopy dilation and curettage, zonia ablation - Removal of old IUD and Laparoscopic bilateral salpingectomy      . (Bilateral: Pelvis)      .    [facility]: [facility] / [facility]    Surgeons: Andrei Flores MD            Relevant Problems   Anesthesia (within normal limits)      GYN   (+) DUB (dysfunctional uterine bleeding)       Clinical information reviewed:   Tobacco  Allergies  Meds  Problems  Med Hx  Surg Hx  OB Status    Fam Hx  Soc Hx        NPO Detail:  NPO/Void Status  Carbohydrate Drink Given Prior to Surgery? : N  Date of Last Liquid: 04/01/25  Time of Last Liquid: 2130  Date of Last Solid: 04/01/25  Time of Last Solid: 2130  Last Intake Type: Clear fluids  Time of Last Void: 0845         Physical Exam    Airway  Mallampati: II  TM distance: >3 FB  Neck ROM: full     Cardiovascular - normal exam     Dental - normal exam     Pulmonary - normal exam     Abdominal - normal exam           Anesthesia Plan    History of general anesthesia?: yes  History of complications of general anesthesia?: no    ASA 2     general     The patient is not a current smoker.    intravenous induction   Postoperative administration of opioids is intended.  Anesthetic plan and risks discussed with patient.    Plan discussed with CRNA.

## 2025-04-04 LAB
LABORATORY COMMENT REPORT: NORMAL
LABORATORY COMMENT REPORT: NORMAL
PATH REPORT.FINAL DX SPEC: NORMAL
PATH REPORT.GROSS SPEC: NORMAL
PATH REPORT.RELEVANT HX SPEC: NORMAL
PATH REPORT.TOTAL CANCER: NORMAL

## 2025-04-08 LAB
LABORATORY COMMENT REPORT: NORMAL
PATH REPORT.COMMENTS IMP SPEC: NORMAL
PATH REPORT.FINAL DX SPEC: NORMAL
PATH REPORT.GROSS SPEC: NORMAL
PATH REPORT.RELEVANT HX SPEC: NORMAL
PATH REPORT.TOTAL CANCER: NORMAL

## 2025-04-14 ENCOUNTER — APPOINTMENT (OUTPATIENT)
Dept: OBSTETRICS AND GYNECOLOGY | Facility: CLINIC | Age: 45
End: 2025-04-14
Payer: COMMERCIAL

## 2025-04-14 VITALS
WEIGHT: 226 LBS | DIASTOLIC BLOOD PRESSURE: 98 MMHG | BODY MASS INDEX: 36.32 KG/M2 | SYSTOLIC BLOOD PRESSURE: 130 MMHG | HEIGHT: 66 IN

## 2025-04-14 DIAGNOSIS — Z12.31 ENCOUNTER FOR SCREENING MAMMOGRAM FOR MALIGNANT NEOPLASM OF BREAST: ICD-10-CM

## 2025-04-14 DIAGNOSIS — Z09 POSTOP CHECK: Primary | ICD-10-CM

## 2025-04-14 PROCEDURE — 99024 POSTOP FOLLOW-UP VISIT: CPT | Performed by: OBSTETRICS & GYNECOLOGY

## 2025-04-14 PROCEDURE — 3008F BODY MASS INDEX DOCD: CPT | Performed by: OBSTETRICS & GYNECOLOGY

## 2025-04-14 NOTE — PROGRESS NOTES
Subjective   Patient ID: Yuki Mclaughlin is a 44 y.o. female who presents for No chief complaint on file..  HPI postop check.  Status post laparoscopic bilateral salpingectomy, hysteroscopy D&C and endometrial ablation.  She reports some mild brownish discharge.  She denies any abdominal or pelvic pain.  She has been back to work without any problems.    Review of Systems   All other systems reviewed and are negative.      Objective   Physical Exam  Constitutional:       Appearance: Normal appearance.   Pulmonary:      Effort: Pulmonary effort is normal.   Abdominal:      General: Abdomen is flat.      Palpations: Abdomen is soft.      Comments: Steri-Strips were removed the incision looks good and healing well.   Neurological:      Mental Status: She is alert.         Assessment/Plan   Problem List Items Addressed This Visit    None  Visit Diagnoses         Codes    Postop check    -  Primary Z09    Encounter for screening mammogram for malignant neoplasm of breast     Z12.31    Relevant Orders    BI mammo bilateral screening tomosynthesis        She is due for mammogram this was ordered.  I have reassured her that discharge is not unusual after ambulation and may last up to 2 to 3 months.  I have recommended she returns in September for her yearly exam.  Her pathology report were reviewed all benign.         Andrei Flores MD 04/14/25 12:17 PM

## 2025-04-25 ENCOUNTER — HOSPITAL ENCOUNTER (OUTPATIENT)
Dept: RADIOLOGY | Facility: CLINIC | Age: 45
Discharge: HOME | End: 2025-04-25
Payer: COMMERCIAL

## 2025-04-25 VITALS — WEIGHT: 226 LBS | BODY MASS INDEX: 36.32 KG/M2 | HEIGHT: 66 IN

## 2025-04-25 DIAGNOSIS — Z12.31 ENCOUNTER FOR SCREENING MAMMOGRAM FOR MALIGNANT NEOPLASM OF BREAST: ICD-10-CM

## 2025-04-25 PROCEDURE — 77067 SCR MAMMO BI INCL CAD: CPT | Performed by: RADIOLOGY

## 2025-04-25 PROCEDURE — 77063 BREAST TOMOSYNTHESIS BI: CPT | Performed by: RADIOLOGY

## 2025-04-25 PROCEDURE — 77067 SCR MAMMO BI INCL CAD: CPT

## 2026-01-15 ENCOUNTER — APPOINTMENT (OUTPATIENT)
Dept: OBSTETRICS AND GYNECOLOGY | Facility: CLINIC | Age: 46
End: 2026-01-15
Payer: COMMERCIAL

## (undated) DEVICE — DRESSING, TRANSPARENT, TEGADERM, 2-3/8 X 2-3/4 IN

## (undated) DEVICE — SOLUTION, CHLORHEXIDINE, 4%, 4OZ

## (undated) DEVICE — APPLICATOR, CHLORAPREP, W/ORANGE TINT, 26ML

## (undated) DEVICE — TROCAR, OPTICAL, BLADELESS, 12MM, THREADED, 100MM LENGTH

## (undated) DEVICE — TOWEL PACK, STERILE, 4/PACK, BLUE

## (undated) DEVICE — TROCAR, KII OPTICAL BLADELESS 5MM Z THREAD 100MM LNGTH

## (undated) DEVICE — STRIP, SKIN CLOSURE, STERI STRIP, REINFORCED, 0.5 X 4 IN

## (undated) DEVICE — SLEEVE, KII, Z-THREAD, 5X100CM

## (undated) DEVICE — IRRIGATION SET, CYSTOSCOPY, REGULATING CLAMP, STRAIGHT, 81 IN

## (undated) DEVICE — ADHESIVE, SKIN, MASTISOL, 2/3 CC VIAL

## (undated) DEVICE — GLOVE, PROTEXIS PI CLASSIC, SZ-6.5, PF, LF

## (undated) DEVICE — DRAPE, UNDERBUTTOCKS, W/ 27IN FLUID POUCH

## (undated) DEVICE — SOLUTION, IRRIGATION, SODIUM CHLORIDE 0.9%, 1000 ML, POUR BOTTLE

## (undated) DEVICE — TUBING, NON COND, 6MM X 20

## (undated) DEVICE — KIT, GYNECOLOGY LAPAROSCOPY, CUSTOM, PORTAGE

## (undated) DEVICE — CATHETER, RED RUBBER 16FR

## (undated) DEVICE — PREP TRAY, SKIN, DRY, W/GLOVES

## (undated) DEVICE — ASSEMBLY, STRYKER FLOW 2, SUCTION IRRIGATOR, WITH TIP

## (undated) DEVICE — MARKER, SKIN, DUAL TIP INK W/9 LABEL AND REMOVABLE TIME OUT SLEEVE

## (undated) DEVICE — SCISSOR TIP, ENDOCUT, LAPAROSCOPIC

## (undated) DEVICE — SYSTEM, SMOKE EVAC, SEECLEAR XCL, 8.0 LITER, LF

## (undated) DEVICE — DEVICE, VOYANT, 5MM X 37CM

## (undated) DEVICE — SYRINGE, 10 CC, LUER LOCK

## (undated) DEVICE — NEEDLE, INSUFFLATION, 13GAX120MM, DISP

## (undated) DEVICE — PAD, GROUNDING, ELECTROSURGICAL, W/9 FT CABLE, POLYHESIVE II, ADULT, LF

## (undated) DEVICE — TUBING, SUCTION, UHI HIGH FLOW F/ OLYMPUS SYSTEM

## (undated) DEVICE — DRAPE, LEGGINGS, 48 X 31 IN, STERILE, LF

## (undated) DEVICE — TROCAR, KII OPTICAL SEPARATOR, 8MM X 100MM,  Z-THREAD

## (undated) DEVICE — Device

## (undated) DEVICE — SUTURE, VICRYL, 4-0, 18 IN, PS2, UNDYED

## (undated) DEVICE — COVER HANDLE LIGHT, STERIS, BLUE, STERILE

## (undated) DEVICE — SOLUTION, IRRIGATION, USP, SODIUM CHLORIDE 0.9%, 3000 ML

## (undated) DEVICE — DRESSING, GAUZE, SPONGE, 8 PLY, CURITY, 2 X 2 IN, STERILE

## (undated) DEVICE — GOWN, ASTOUND, XL